# Patient Record
Sex: FEMALE | Race: WHITE | NOT HISPANIC OR LATINO | Employment: FULL TIME | URBAN - NONMETROPOLITAN AREA
[De-identification: names, ages, dates, MRNs, and addresses within clinical notes are randomized per-mention and may not be internally consistent; named-entity substitution may affect disease eponyms.]

---

## 2023-05-02 ENCOUNTER — TELEPHONE (OUTPATIENT)
Dept: PRIMARY CARE | Facility: CLINIC | Age: 23
End: 2023-05-02

## 2023-05-02 NOTE — TELEPHONE ENCOUNTER
Left pt a message informing her of the following; requested a call back, if she has any questions.

## 2023-05-02 NOTE — TELEPHONE ENCOUNTER
----- Message from DAY Felix sent at 5/2/2023  9:56 AM EDT -----  Let her know her US showed resolution of the area in question in the right breast. No mass or asymmetry identified.

## 2023-06-08 ENCOUNTER — TELEPHONE (OUTPATIENT)
Dept: PRIMARY CARE | Facility: CLINIC | Age: 23
End: 2023-06-08

## 2023-06-08 NOTE — TELEPHONE ENCOUNTER
Pt just found out she is pregnant.  She is inquiring if she should still be taking her fluoxetine.

## 2023-06-12 NOTE — TELEPHONE ENCOUNTER
Left pt a message informing her of the following; requested a call back regarding weaning off of medication, and to see if she has been to Psych or Counseling.